# Patient Record
Sex: FEMALE | Race: WHITE | NOT HISPANIC OR LATINO | URBAN - METROPOLITAN AREA
[De-identification: names, ages, dates, MRNs, and addresses within clinical notes are randomized per-mention and may not be internally consistent; named-entity substitution may affect disease eponyms.]

---

## 2019-07-08 ENCOUNTER — INPATIENT (INPATIENT)
Facility: HOSPITAL | Age: 69
LOS: 1 days | Discharge: HOME | DRG: 287 | End: 2019-07-10
Attending: INTERNAL MEDICINE | Admitting: INTERNAL MEDICINE
Payer: COMMERCIAL

## 2019-07-08 VITALS
HEART RATE: 68 BPM | WEIGHT: 115.08 LBS | RESPIRATION RATE: 16 BRPM | TEMPERATURE: 98 F | DIASTOLIC BLOOD PRESSURE: 84 MMHG | SYSTOLIC BLOOD PRESSURE: 132 MMHG | OXYGEN SATURATION: 98 % | HEIGHT: 62 IN

## 2019-07-08 DIAGNOSIS — R94.39 ABNORMAL RESULT OF OTHER CARDIOVASCULAR FUNCTION STUDY: ICD-10-CM

## 2019-07-08 LAB
ALBUMIN SERPL ELPH-MCNC: 3.8 G/DL — SIGNIFICANT CHANGE UP (ref 3.3–5)
ALP SERPL-CCNC: 49 U/L — SIGNIFICANT CHANGE UP (ref 40–120)
ALT FLD-CCNC: 21 U/L — SIGNIFICANT CHANGE UP (ref 10–45)
ANION GAP SERPL CALC-SCNC: 12 MMOL/L — SIGNIFICANT CHANGE UP (ref 5–17)
APTT BLD: 26 SEC — LOW (ref 27.5–36.3)
AST SERPL-CCNC: 23 U/L — SIGNIFICANT CHANGE UP (ref 10–40)
BASOPHILS # BLD AUTO: 0 K/UL — SIGNIFICANT CHANGE UP (ref 0–0.2)
BASOPHILS NFR BLD AUTO: 0.2 % — SIGNIFICANT CHANGE UP (ref 0–2)
BILIRUB SERPL-MCNC: 0.6 MG/DL — SIGNIFICANT CHANGE UP (ref 0.2–1.2)
BUN SERPL-MCNC: 11 MG/DL — SIGNIFICANT CHANGE UP (ref 7–23)
CALCIUM SERPL-MCNC: 8.5 MG/DL — SIGNIFICANT CHANGE UP (ref 8.4–10.5)
CHLORIDE SERPL-SCNC: 101 MMOL/L — SIGNIFICANT CHANGE UP (ref 96–108)
CK MB CFR SERPL CALC: 4.3 NG/ML — HIGH (ref 0–3.8)
CK SERPL-CCNC: 76 U/L — SIGNIFICANT CHANGE UP (ref 25–170)
CO2 SERPL-SCNC: 25 MMOL/L — SIGNIFICANT CHANGE UP (ref 22–31)
CREAT SERPL-MCNC: 0.78 MG/DL — SIGNIFICANT CHANGE UP (ref 0.5–1.3)
EOSINOPHIL # BLD AUTO: 0.2 K/UL — SIGNIFICANT CHANGE UP (ref 0–0.5)
EOSINOPHIL NFR BLD AUTO: 2.1 % — SIGNIFICANT CHANGE UP (ref 0–6)
GLUCOSE SERPL-MCNC: 127 MG/DL — HIGH (ref 70–99)
HCT VFR BLD CALC: 41.4 % — SIGNIFICANT CHANGE UP (ref 34.5–45)
HGB BLD-MCNC: 14.5 G/DL — SIGNIFICANT CHANGE UP (ref 11.5–15.5)
INR BLD: 1.01 RATIO — SIGNIFICANT CHANGE UP (ref 0.88–1.16)
LYMPHOCYTES # BLD AUTO: 2 K/UL — SIGNIFICANT CHANGE UP (ref 1–3.3)
LYMPHOCYTES # BLD AUTO: 24.7 % — SIGNIFICANT CHANGE UP (ref 13–44)
MCHC RBC-ENTMCNC: 34.2 PG — HIGH (ref 27–34)
MCHC RBC-ENTMCNC: 35.1 GM/DL — SIGNIFICANT CHANGE UP (ref 32–36)
MCV RBC AUTO: 97.5 FL — SIGNIFICANT CHANGE UP (ref 80–100)
MONOCYTES # BLD AUTO: 0.6 K/UL — SIGNIFICANT CHANGE UP (ref 0–0.9)
MONOCYTES NFR BLD AUTO: 7.6 % — SIGNIFICANT CHANGE UP (ref 2–14)
NEUTROPHILS # BLD AUTO: 5.4 K/UL — SIGNIFICANT CHANGE UP (ref 1.8–7.4)
NEUTROPHILS NFR BLD AUTO: 65.4 % — SIGNIFICANT CHANGE UP (ref 43–77)
NT-PROBNP SERPL-SCNC: 2616 PG/ML — HIGH (ref 0–300)
PLATELET # BLD AUTO: 215 K/UL — SIGNIFICANT CHANGE UP (ref 150–400)
POTASSIUM SERPL-MCNC: 4 MMOL/L — SIGNIFICANT CHANGE UP (ref 3.5–5.3)
POTASSIUM SERPL-SCNC: 4 MMOL/L — SIGNIFICANT CHANGE UP (ref 3.5–5.3)
PROT SERPL-MCNC: 6.3 G/DL — SIGNIFICANT CHANGE UP (ref 6–8.3)
PROTHROM AB SERPL-ACNC: 11.6 SEC — SIGNIFICANT CHANGE UP (ref 10–12.9)
RBC # BLD: 4.24 M/UL — SIGNIFICANT CHANGE UP (ref 3.8–5.2)
RBC # FLD: 11.8 % — SIGNIFICANT CHANGE UP (ref 10.3–14.5)
SODIUM SERPL-SCNC: 138 MMOL/L — SIGNIFICANT CHANGE UP (ref 135–145)
TROPONIN T, HIGH SENSITIVITY RESULT: 118 NG/L — HIGH (ref 0–51)
TROPONIN T, HIGH SENSITIVITY RESULT: 128 NG/L — HIGH (ref 0–51)
WBC # BLD: 8.2 K/UL — SIGNIFICANT CHANGE UP (ref 3.8–10.5)
WBC # FLD AUTO: 8.2 K/UL — SIGNIFICANT CHANGE UP (ref 3.8–10.5)

## 2019-07-08 PROCEDURE — 71046 X-RAY EXAM CHEST 2 VIEWS: CPT | Mod: 26

## 2019-07-08 PROCEDURE — 99285 EMERGENCY DEPT VISIT HI MDM: CPT

## 2019-07-08 PROCEDURE — 93010 ELECTROCARDIOGRAM REPORT: CPT

## 2019-07-08 RX ORDER — TICAGRELOR 90 MG/1
180 TABLET ORAL ONCE
Refills: 0 | Status: COMPLETED | OUTPATIENT
Start: 2019-07-08 | End: 2019-07-08

## 2019-07-08 RX ORDER — HEPARIN SODIUM 5000 [USP'U]/ML
2900 INJECTION INTRAVENOUS; SUBCUTANEOUS ONCE
Refills: 0 | Status: COMPLETED | OUTPATIENT
Start: 2019-07-08 | End: 2019-07-08

## 2019-07-08 RX ORDER — HEPARIN SODIUM 5000 [USP'U]/ML
INJECTION INTRAVENOUS; SUBCUTANEOUS
Qty: 25000 | Refills: 0 | Status: DISCONTINUED | OUTPATIENT
Start: 2019-07-08 | End: 2019-07-09

## 2019-07-08 RX ORDER — TICAGRELOR 90 MG/1
90 TABLET ORAL EVERY 12 HOURS
Refills: 0 | Status: DISCONTINUED | OUTPATIENT
Start: 2019-07-08 | End: 2019-07-09

## 2019-07-08 RX ORDER — HEPARIN SODIUM 5000 [USP'U]/ML
2900 INJECTION INTRAVENOUS; SUBCUTANEOUS EVERY 6 HOURS
Refills: 0 | Status: DISCONTINUED | OUTPATIENT
Start: 2019-07-08 | End: 2019-07-09

## 2019-07-08 RX ORDER — ACETAMINOPHEN 500 MG
650 TABLET ORAL EVERY 6 HOURS
Refills: 0 | Status: DISCONTINUED | OUTPATIENT
Start: 2019-07-08 | End: 2019-07-10

## 2019-07-08 RX ORDER — ASPIRIN/CALCIUM CARB/MAGNESIUM 324 MG
324 TABLET ORAL ONCE
Refills: 0 | Status: COMPLETED | OUTPATIENT
Start: 2019-07-08 | End: 2019-07-08

## 2019-07-08 RX ORDER — ASPIRIN/CALCIUM CARB/MAGNESIUM 324 MG
81 TABLET ORAL DAILY
Refills: 0 | Status: DISCONTINUED | OUTPATIENT
Start: 2019-07-08 | End: 2019-07-10

## 2019-07-08 RX ADMIN — TICAGRELOR 180 MILLIGRAM(S): 90 TABLET ORAL at 19:37

## 2019-07-08 RX ADMIN — Medication 324 MILLIGRAM(S): at 18:48

## 2019-07-08 RX ADMIN — HEPARIN SODIUM 600 UNIT(S)/HR: 5000 INJECTION INTRAVENOUS; SUBCUTANEOUS at 19:36

## 2019-07-08 RX ADMIN — HEPARIN SODIUM 2900 UNIT(S): 5000 INJECTION INTRAVENOUS; SUBCUTANEOUS at 19:35

## 2019-07-08 NOTE — ED PROVIDER NOTE - PROGRESS NOTE DETAILS
attending Gladis: cardiologist Moriah ford attending Gladis: cardiologist Moriah paged [phone] attending Gladis: discussed with Dr. Perez who will take the patient on his service. Plan for Brilinta and heparin ACS protocol and cath tomorrow. attending Gladis: positive 1st troponin. Made admitting hospitalist Abrudescu aware who will notify Dr. Perez. Pt receiving heparin gtt.

## 2019-07-08 NOTE — ED PROVIDER NOTE - CLINICAL SUMMARY MEDICAL DECISION MAKING FREE TEXT BOX
attending Gladis: abnormal stress test. Will obtain ekg, place on tele, labs, ASA, discuss with cardiology, admit for cath

## 2019-07-08 NOTE — H&P ADULT - HISTORY OF PRESENT ILLNESS
68yF no PMH sent in by cardiologist after abnormal nuclear stress test today. Pt reports feeling chest tightness and profound generalized fatigue/weakness after exertion 3 days ago. Reports normal stress test several years ago. Never smoker. No fam h/o CAD. Not on ASA daily. +mild chest tightness in ED.

## 2019-07-08 NOTE — H&P ADULT - NSHPPHYSICALEXAM_GEN_ALL_CORE
PHYSICAL EXAMINATION:  Vital Signs Last 24 Hrs  T(C): 36.7 (08 Jul 2019 18:25), Max: 36.7 (08 Jul 2019 18:25)  T(F): 98 (08 Jul 2019 18:25), Max: 98 (08 Jul 2019 18:25)  HR: 68 (08 Jul 2019 18:25) (68 - 68)  BP: 132/84 (08 Jul 2019 18:25) (132/84 - 132/84)  BP(mean): --  RR: 16 (08 Jul 2019 18:25) (16 - 16)  SpO2: 98% (08 Jul 2019 18:25) (98% - 98%)  CAPILLARY BLOOD GLUCOSE          GENERAL: NAD, well-groomed, well-developed  HEAD:  atraumatic, normocephalic  EYES: sclera anicteric  ENMT: mucous membranes moist  NECK: supple, No JVD  CHEST/LUNG: clear to auscultation bilaterally; no rales, rhonchi, or wheezing b/l  HEART: normal S1, S2  ABDOMEN: BS+, soft, ND, NT   EXTREMITIES:  pulses palpable; no clubbing, cyanosis, or edema b/l LEs  NEURO: awake, alert, interactive; moves all extremities  SKIN: no rashes or lesions

## 2019-07-08 NOTE — ED ADULT NURSE NOTE - CHPI ED NUR SYMPTOMS NEG
no chills/no shortness of breath/no vomiting/no dizziness/no nausea/no syncope/no congestion/no diaphoresis/no fever/no back pain

## 2019-07-08 NOTE — ED PROVIDER NOTE - OBJECTIVE STATEMENT
attending Pollack: 68yF no PMH sent in by cardiologist Dr. Major after abnormal nuclear stress test today. Pt reports feeling chest tightness and profound generalized fatigue/weakness after exertion 3 days ago. Reports normal stress test several years ago. Never smoker. No fam h/o CAD. Not on ASA daily. +mild chest tightness in ED.

## 2019-07-08 NOTE — ED ADULT NURSE NOTE - OBJECTIVE STATEMENT
69 y/o female A&Ox3 presents to ED via EMS from PMD for "abnormal nuclear stress test". As per pt, intermittent left side chest pain with no radiation x3 days. Went to PMD who told pt to come to ED. Upon arrival, pt placed on cardiac monitor, NSR with HR in the 70s, EKG completed. B/l breath sounds clear, non labored, no cyanosis, edema or SOB. Abdomen soft, non tender and non distended, no dysuria, or blood in urine or stool. +ROMx4 extremities, no weakness, numbness or tingling. No anticoagulant use, no medical hx. Denies fever, chills, n/v/d. Safety measures maintained.

## 2019-07-08 NOTE — H&P ADULT - NSHPREVIEWOFSYSTEMS_GEN_ALL_CORE
REVIEW OF SYSTEMS:    CONSTITUTIONAL: No weakness, fevers or chills  EYES/ENT: No visual changes;  No vertigo or throat pain   NECK: No pain or stiffness  RESPIRATORY: No cough, wheezing, hemoptysis; + shortness of breath  CARDIOVASCULAR: No chest pain or palpitations Chest tightness.  GASTROINTESTINAL: No abdominal or epigastric pain. No nausea, vomiting, or hematemesis; No diarrhea or constipation. No melena or hematochezia.  GENITOURINARY: No dysuria, frequency or hematuria  NEUROLOGICAL: No numbness or weakness  SKIN: No itching, burning, rashes, or lesions   All other review of systems is negative unless indicated above.

## 2019-07-08 NOTE — H&P ADULT - ASSESSMENT
68 f with    Chest pain  - telemetry  - cardiac enzymes  - Heparin gtt, ASA, Brilinta  - cardiology evaluation Dr. Perez    Osteoporosis  - hold Evista    d/w patient/   Further action as per clinical course   Ángel Vaughn MD pager [phone]

## 2019-07-09 LAB
ANION GAP SERPL CALC-SCNC: 11 MMOL/L — SIGNIFICANT CHANGE UP (ref 5–17)
APTT BLD: 67.9 SEC — HIGH (ref 27.5–36.3)
BUN SERPL-MCNC: 14 MG/DL — SIGNIFICANT CHANGE UP (ref 7–23)
CALCIUM SERPL-MCNC: 8.3 MG/DL — LOW (ref 8.4–10.5)
CHLORIDE SERPL-SCNC: 103 MMOL/L — SIGNIFICANT CHANGE UP (ref 96–108)
CO2 SERPL-SCNC: 25 MMOL/L — SIGNIFICANT CHANGE UP (ref 22–31)
CREAT SERPL-MCNC: 0.85 MG/DL — SIGNIFICANT CHANGE UP (ref 0.5–1.3)
GLUCOSE SERPL-MCNC: 96 MG/DL — SIGNIFICANT CHANGE UP (ref 70–99)
HCT VFR BLD CALC: 39.2 % — SIGNIFICANT CHANGE UP (ref 34.5–45)
HCT VFR BLD CALC: 41.7 % — SIGNIFICANT CHANGE UP (ref 34.5–45)
HCV AB S/CO SERPL IA: 0.13 S/CO — SIGNIFICANT CHANGE UP (ref 0–0.99)
HCV AB SERPL-IMP: SIGNIFICANT CHANGE UP
HGB BLD-MCNC: 13.8 G/DL — SIGNIFICANT CHANGE UP (ref 11.5–15.5)
HGB BLD-MCNC: 14.2 G/DL — SIGNIFICANT CHANGE UP (ref 11.5–15.5)
MCHC RBC-ENTMCNC: 33.2 PG — SIGNIFICANT CHANGE UP (ref 27–34)
MCHC RBC-ENTMCNC: 34 GM/DL — SIGNIFICANT CHANGE UP (ref 32–36)
MCHC RBC-ENTMCNC: 34.3 PG — HIGH (ref 27–34)
MCHC RBC-ENTMCNC: 35.2 GM/DL — SIGNIFICANT CHANGE UP (ref 32–36)
MCV RBC AUTO: 97.5 FL — SIGNIFICANT CHANGE UP (ref 80–100)
MCV RBC AUTO: 97.6 FL — SIGNIFICANT CHANGE UP (ref 80–100)
PLATELET # BLD AUTO: 209 K/UL — SIGNIFICANT CHANGE UP (ref 150–400)
PLATELET # BLD AUTO: 212 K/UL — SIGNIFICANT CHANGE UP (ref 150–400)
POTASSIUM SERPL-MCNC: 3.9 MMOL/L — SIGNIFICANT CHANGE UP (ref 3.5–5.3)
POTASSIUM SERPL-SCNC: 3.9 MMOL/L — SIGNIFICANT CHANGE UP (ref 3.5–5.3)
RBC # BLD: 4.02 M/UL — SIGNIFICANT CHANGE UP (ref 3.8–5.2)
RBC # BLD: 4.27 M/UL — SIGNIFICANT CHANGE UP (ref 3.8–5.2)
RBC # FLD: 11.7 % — SIGNIFICANT CHANGE UP (ref 10.3–14.5)
RBC # FLD: 11.8 % — SIGNIFICANT CHANGE UP (ref 10.3–14.5)
SODIUM SERPL-SCNC: 139 MMOL/L — SIGNIFICANT CHANGE UP (ref 135–145)
TROPONIN T, HIGH SENSITIVITY RESULT: 106 NG/L — HIGH (ref 0–51)
WBC # BLD: 6.7 K/UL — SIGNIFICANT CHANGE UP (ref 3.8–10.5)
WBC # BLD: 7.2 K/UL — SIGNIFICANT CHANGE UP (ref 3.8–10.5)
WBC # FLD AUTO: 6.7 K/UL — SIGNIFICANT CHANGE UP (ref 3.8–10.5)
WBC # FLD AUTO: 7.2 K/UL — SIGNIFICANT CHANGE UP (ref 3.8–10.5)

## 2019-07-09 PROCEDURE — 93306 TTE W/DOPPLER COMPLETE: CPT | Mod: 26

## 2019-07-09 RX ORDER — RALOXIFENE HYDROCHLORIDE 60 MG/1
1 TABLET, COATED ORAL
Qty: 0 | Refills: 0 | DISCHARGE

## 2019-07-09 RX ORDER — CHOLECALCIFEROL (VITAMIN D3) 125 MCG
1 CAPSULE ORAL
Qty: 0 | Refills: 0 | DISCHARGE

## 2019-07-09 RX ADMIN — Medication 81 MILLIGRAM(S): at 08:24

## 2019-07-09 RX ADMIN — TICAGRELOR 90 MILLIGRAM(S): 90 TABLET ORAL at 08:24

## 2019-07-09 RX ADMIN — HEPARIN SODIUM 600 UNIT(S)/HR: 5000 INJECTION INTRAVENOUS; SUBCUTANEOUS at 02:04

## 2019-07-10 ENCOUNTER — TRANSCRIPTION ENCOUNTER (OUTPATIENT)
Age: 69
End: 2019-07-10

## 2019-07-10 VITALS
TEMPERATURE: 98 F | HEART RATE: 73 BPM | DIASTOLIC BLOOD PRESSURE: 63 MMHG | RESPIRATION RATE: 16 BRPM | OXYGEN SATURATION: 98 % | SYSTOLIC BLOOD PRESSURE: 101 MMHG

## 2019-07-10 LAB
ANION GAP SERPL CALC-SCNC: 8 MMOL/L — SIGNIFICANT CHANGE UP (ref 5–17)
BUN SERPL-MCNC: 14 MG/DL — SIGNIFICANT CHANGE UP (ref 7–23)
CALCIUM SERPL-MCNC: 8.6 MG/DL — SIGNIFICANT CHANGE UP (ref 8.4–10.5)
CHLORIDE SERPL-SCNC: 98 MMOL/L — SIGNIFICANT CHANGE UP (ref 96–108)
CO2 SERPL-SCNC: 25 MMOL/L — SIGNIFICANT CHANGE UP (ref 22–31)
CORTIS AM PEAK SERPL-MCNC: 16.3 UG/DL — SIGNIFICANT CHANGE UP (ref 6–18.4)
CREAT SERPL-MCNC: 0.84 MG/DL — SIGNIFICANT CHANGE UP (ref 0.5–1.3)
GLUCOSE SERPL-MCNC: 122 MG/DL — HIGH (ref 70–99)
HCT VFR BLD CALC: 39.9 % — SIGNIFICANT CHANGE UP (ref 34.5–45)
HGB BLD-MCNC: 14.1 G/DL — SIGNIFICANT CHANGE UP (ref 11.5–15.5)
MCHC RBC-ENTMCNC: 34.3 PG — HIGH (ref 27–34)
MCHC RBC-ENTMCNC: 35.3 GM/DL — SIGNIFICANT CHANGE UP (ref 32–36)
MCV RBC AUTO: 97.3 FL — SIGNIFICANT CHANGE UP (ref 80–100)
PLATELET # BLD AUTO: 197 K/UL — SIGNIFICANT CHANGE UP (ref 150–400)
POTASSIUM SERPL-MCNC: 4.1 MMOL/L — SIGNIFICANT CHANGE UP (ref 3.5–5.3)
POTASSIUM SERPL-SCNC: 4.1 MMOL/L — SIGNIFICANT CHANGE UP (ref 3.5–5.3)
RBC # BLD: 4.1 M/UL — SIGNIFICANT CHANGE UP (ref 3.8–5.2)
RBC # FLD: 11.8 % — SIGNIFICANT CHANGE UP (ref 10.3–14.5)
SODIUM SERPL-SCNC: 131 MMOL/L — LOW (ref 135–145)
TSH SERPL-MCNC: 2.76 UIU/ML — SIGNIFICANT CHANGE UP (ref 0.27–4.2)
WBC # BLD: 9.4 K/UL — SIGNIFICANT CHANGE UP (ref 3.8–10.5)
WBC # FLD AUTO: 9.4 K/UL — SIGNIFICANT CHANGE UP (ref 3.8–10.5)

## 2019-07-10 PROCEDURE — 84443 ASSAY THYROID STIM HORMONE: CPT

## 2019-07-10 PROCEDURE — C1760: CPT

## 2019-07-10 PROCEDURE — 93458 L HRT ARTERY/VENTRICLE ANGIO: CPT

## 2019-07-10 PROCEDURE — 82553 CREATINE MB FRACTION: CPT

## 2019-07-10 PROCEDURE — C1887: CPT

## 2019-07-10 PROCEDURE — 71046 X-RAY EXAM CHEST 2 VIEWS: CPT

## 2019-07-10 PROCEDURE — 99285 EMERGENCY DEPT VISIT HI MDM: CPT | Mod: 25

## 2019-07-10 PROCEDURE — 82533 TOTAL CORTISOL: CPT

## 2019-07-10 PROCEDURE — 86803 HEPATITIS C AB TEST: CPT

## 2019-07-10 PROCEDURE — 80048 BASIC METABOLIC PNL TOTAL CA: CPT

## 2019-07-10 PROCEDURE — 85610 PROTHROMBIN TIME: CPT

## 2019-07-10 PROCEDURE — 80053 COMPREHEN METABOLIC PANEL: CPT

## 2019-07-10 PROCEDURE — 93306 TTE W/DOPPLER COMPLETE: CPT

## 2019-07-10 PROCEDURE — C1894: CPT

## 2019-07-10 PROCEDURE — 84484 ASSAY OF TROPONIN QUANT: CPT

## 2019-07-10 PROCEDURE — 99153 MOD SED SAME PHYS/QHP EA: CPT

## 2019-07-10 PROCEDURE — 85730 THROMBOPLASTIN TIME PARTIAL: CPT

## 2019-07-10 PROCEDURE — C1769: CPT

## 2019-07-10 PROCEDURE — 93005 ELECTROCARDIOGRAM TRACING: CPT

## 2019-07-10 PROCEDURE — 82550 ASSAY OF CK (CPK): CPT

## 2019-07-10 PROCEDURE — 99152 MOD SED SAME PHYS/QHP 5/>YRS: CPT

## 2019-07-10 PROCEDURE — 83880 ASSAY OF NATRIURETIC PEPTIDE: CPT

## 2019-07-10 PROCEDURE — 85027 COMPLETE CBC AUTOMATED: CPT

## 2019-07-10 RX ORDER — ASPIRIN/CALCIUM CARB/MAGNESIUM 324 MG
1 TABLET ORAL
Qty: 0 | Refills: 0 | DISCHARGE
Start: 2019-07-10

## 2019-07-10 RX ADMIN — Medication 81 MILLIGRAM(S): at 05:22

## 2019-07-10 NOTE — DISCHARGE NOTE NURSING/CASE MANAGEMENT/SOCIAL WORK - NSDCDPATPORTLINK_GEN_ALL_CORE
You can access the [facility] Portal, offered by [facility], by registering with the following website: [email or web address]

## 2019-07-10 NOTE — DISCHARGE NOTE PROVIDER - HOSPITAL COURSE
HPI:    68yF no PMH sent in by cardiologist after abnormal nuclear stress test today. Pt reports feeling chest tightness and profound generalized fatigue/weakness after exertion 3 days ago. Reports normal stress test several years ago. Never smoker. No fam h/o CAD. Not on ASA daily. +mild chest tightness in ED. (08 Jul 2019 20:17).  Pt s/p diagnostic cath with normal coronaries with echo revealing Takotsubo pattern MR/CARLTON and mild LVOT obstruction most likely from acute dilatation of the distal left ventricular segments LVEF 35-40%.  Pt will be treated medically although she is unable to be treated with BB or ACEI 2/2 hypotension with negative orthostatic BP and HR.  Pt's BP equilibrates to baseline upon standing and walking.  Her TSH and cortisol levels are WNL.  She has been seen and counseled by Dr. Perez to remain hydrated and avoid strenuous exercise for now until her LV function improves.  She will f/u with Dr. Andrew for repeat echo and followup.

## 2019-07-10 NOTE — PROGRESS NOTE ADULT - SUBJECTIVE AND OBJECTIVE BOX
Patient is a 68y old  Female who presents with a chief complaint of sob (10 Jul 2019 16:48)      SUBJECTIVE / OVERNIGHT EVENTS: Comfortable without new complaints.   Review of Systems  chest pain no  palpitations no  sob no  nausea no  headache no    MEDICATIONS  (STANDING):  aspirin enteric coated 81 milliGRAM(s) Oral daily    MEDICATIONS  (PRN):  acetaminophen   Tablet .. 650 milliGRAM(s) Oral every 6 hours PRN Temp greater or equal to 38.5C (101.3F), Mild Pain (1 - 3)      Vital Signs Last 24 Hrs  T(C): 36.7 (10 Jul 2019 17:14), Max: 37.1 (10 Jul 2019 05:16)  T(F): 98 (10 Jul 2019 17:14), Max: 98.8 (10 Jul 2019 05:16)  HR: 73 (10 Jul 2019 17:14) (69 - 76)  BP: 101/63 (10 Jul 2019 17:14) (88/48 - 107/73)  BP(mean): --  RR: 16 (10 Jul 2019 17:14) (16 - 17)  SpO2: 98% (10 Jul 2019 17:14) (97% - 99%)    PHYSICAL EXAM:  GENERAL: NAD, well-developed  HEAD:  Atraumatic, Normocephalic  EYES: EOMI, PERRLA, conjunctiva and sclera clear  NECK: Supple, No JVD  CHEST/LUNG: Clear to auscultation bilaterally; No wheeze  HEART: Regular rate and rhythm; No murmurs, rubs, or gallops  ABDOMEN: Soft, Nontender, Nondistended; Bowel sounds present  EXTREMITIES:  2+ Peripheral Pulses, No clubbing, cyanosis, or edema  PSYCH: AAOx3  NEUROLOGY: non-focal  SKIN: No rashes or lesions    LABS:                        14.1   9.4   )-----------( 197      ( 10 Jul 2019 00:28 )             39.9     07-10    131<L>  |  98  |  14  ----------------------------<  122<H>  4.1   |  25  |  0.84    Ca    8.6      10 Jul 2019 00:28    TPro  6.3  /  Alb  3.8  /  TBili  0.6  /  DBili  x   /  AST  23  /  ALT  21  /  AlkPhos  49  07-08    PT/INR - ( 08 Jul 2019 19:02 )   PT: 11.6 sec;   INR: 1.01 ratio         PTT - ( 09 Jul 2019 01:29 )  PTT:67.9 sec  CARDIAC MARKERS ( 08 Jul 2019 21:12 )  x     / x     / 76 U/L / x     / 4.3 ng/mL        < from: Transthoracic Echocardiogram (07.09.19 @ 11:59) >  Conclusions:  1. Tethered mitral valve leaflets with normal  opening.Chordal CARLTON notes. Peak LVOT with valsalva 21mmHg  Moderate-severe mitral regurgitation.  2. Moderate- Severe segmental left ventricular systolic  dysfunction. The mid anterior wall is hypokinetic. The  apical anterior wall, the apical lateral wall, the  mid-apical  inferior wall, the mid -apical septum, and the  apical cap are akinetic.  3. Normal right ventricular size and function.  *** No previous Echo exam.    < end of copied text >      RADIOLOGY & ADDITIONAL TESTS:    Imaging Personally Reviewed:    Consultant(s) Notes Reviewed:      Care Discussed with Consultants/Other Providers:
CARDIOLOGY FOLLOW UP - Dr. Perez    CC no cp or sob       PHYSICAL EXAM:  T(C): 36.7 (07-10-19 @ 13:01), Max: 37.1 (07-10-19 @ 05:16)  HR: 75 (07-10-19 @ 13:01) (69 - 76)  BP: 96/56 (07-10-19 @ 13:01) (88/48 - 107/73)  RR: 17 (07-10-19 @ 13:01) (16 - 17)  SpO2: 97% (07-10-19 @ 13:01) (97% - 99%)  Wt(kg): --  I&O's Summary    09 Jul 2019 07:01  -  10 Jul 2019 07:00  --------------------------------------------------------  IN: 660 mL / OUT: 0 mL / NET: 660 mL    10 Jul 2019 07:01  -  10 Jul 2019 15:25  --------------------------------------------------------  IN: 240 mL / OUT: 0 mL / NET: 240 mL        Appearance: Normal	  Cardiovascular: Normal S1 S2,RRR,+ murmurs  Respiratory: Lungs clear to auscultation	  Gastrointestinal:  Soft, Non-tender, + BS	  Extremities: Normal range of motion, No clubbing, cyanosis or edema  right groin CDI no hematoma + pulses bl LE       MEDICATIONS  (STANDING):  aspirin enteric coated 81 milliGRAM(s) Oral daily      TELEMETRY: NSR 	    ECG:  	  RADIOLOGY:   DIAGNOSTIC TESTING:  [x ] Echocardiogram:  < from: Transthoracic Echocardiogram (07.09.19 @ 11:59) >  EF (Visual Estimate): 35-40 %  ------------------------------------------------------------------------  Observations:  Mitral Valve: Tethered mitral valve leaflets with normal  opening.Chordal CARLTON notes. Peak LVOT with valsalva 21mmHg  Moderate-severe mitral regurgitation.  Aortic Valve/Aorta: Normal trileaflet aortic valve. Peak  left ventricular outflowtract gradient equals 12 mm Hg.  Aortic Root: 3.4 cm.  LVOT diameter: 1.9 cm.  Left Atrium: Normal left atrium.  LA volume index = 23  cc/m2.  Left Ventricle: Moderate- Severe segmental left ventricular  systolic dysfunction. The mid anterior wall ishypokinetic.  The apical anterior wall, the apical lateral wall, the  mid-apical  inferior wall, the mid -apical septum, and the  apical cap are akinetic.  Right Heart: Mild right atrial enlargement. Normal right  ventricular size and function. Normal tricuspid valve. Mild  tricuspid regurgitation. Normal pulmonic valve.  Pericardium/Pleura: Normal pericardium with trace  pericardial effusion.  Hemodynamic: Estimated right atrial pressure is 8 mm Hg.  Estimated right ventricular systolic pressureequals 29 mm  Hg, assuming right atrial pressure equals 8 mm Hg,  consistent with normal pulmonary pressures.  ------------------------------------------------------------------------  Conclusions:  1. Tethered mitral valve leaflets with normal  opening.Chordal CARLTON notes. Peak LVOT with valsalva 21mmHg  Moderate-severe mitral regurgitation.  2. Moderate- Severe segmental left ventricular systolic  dysfunction. The mid anterior wall is hypokinetic. The  apical anterior wall, the apical lateral wall, the  mid-apical  inferior wall, the mid -apical septum, and the  apical cap are akinetic.  3. Normal right ventricular size and function.  *** No previous Echo exam.  ------------------------------------------------------------------------  Confirmed on  7/10/2019 - 14:45:57 by Karli Leonel, M.D.  ------------------------------------------------------------------------    < end of copied text >    [ x ]  Catheterization:  < from: Cardiac Cath Lab - Adult (07.09.19 @ 08:45) >  CORONARY VESSELS: The coronary circulation is right dominant.  LM:   --  LM: Normal.  LAD:   --  LAD: Normal.  CX:   --  Circumflex: Normal.  RCA:   --  RCA: Normal.  COMPLICATIONS: There were no complications.  DIAGNOSTIC IMPRESSIONS: The coronary anatomy is normal. Left ventricular  function is abnormal. Moderate, non ischemic cardiomyopathy. Cinical  picture consistent with a likely stress-induced cardiomyopathy.  DIAGNOSTIC RECOMMENDATIONS: Medical management of LV dysfunction.  Continue routine post-cath care.  Anticipate discharge later today if clinical course remains uncomplicated.  INTERVENTIONAL RECOMMENDATIONS: Medical management of LV dysfunction.  Continue routine post-cath care.  Anticipate discharge later today if clinical course remains uncomplicated.  DISPOSITION: The patient left the catheterization laboratory in stable  condition.  Prepared and signed by  Lukas Perez M.D.    < end of copied text >    [ ] Stress Test:    OTHER: 	    LABS:	 	    Troponin T, High Sensitivity Result: 106 ng/L [0 - 51] (07-09 @ 04:59)  Creatine Kinase, Serum: 76 U/L [25 - 170] (07-08 @ 21:12)  CKMB Units: 4.3 ng/mL [0.0 - 3.8] (07-08 @ 21:12)  Troponin T, High Sensitivity Result: 128 ng/L [0 - 51] (07-08 @ 21:12)  Troponin T, High Sensitivity Result: 118 ng/L [0 - 51] (07-08 @ 19:02)                          14.1   9.4   )-----------( 197      ( 10 Jul 2019 00:28 )             39.9     07-10    131<L>  |  98  |  14  ----------------------------<  122<H>  4.1   |  25  |  0.84    Ca    8.6      10 Jul 2019 00:28    TPro  6.3  /  Alb  3.8  /  TBili  0.6  /  DBili  x   /  AST  23  /  ALT  21  /  AlkPhos  49  07-08    PT/INR - ( 08 Jul 2019 19:02 )   PT: 11.6 sec;   INR: 1.01 ratio         PTT - ( 09 Jul 2019 01:29 )  PTT:67.9 sec
Patient is a 68y old  Female who presents with a chief complaint of sob (08 Jul 2019 20:17)      SUBJECTIVE / OVERNIGHT EVENTS: Comfortable without new complaints.   Review of Systems  chest pain no  palpitations no  sob no  nausea no  headache no    MEDICATIONS  (STANDING):  aspirin enteric coated 81 milliGRAM(s) Oral daily  heparin  Infusion.  Unit(s)/Hr (6 mL/Hr) IV Continuous <Continuous>  ticagrelor 90 milliGRAM(s) Oral every 12 hours    MEDICATIONS  (PRN):  acetaminophen   Tablet .. 650 milliGRAM(s) Oral every 6 hours PRN Temp greater or equal to 38.5C (101.3F), Mild Pain (1 - 3)  heparin  Injectable 2900 Unit(s) IV Push every 6 hours PRN For aPTT less than 40      Vital Signs Last 24 Hrs  T(C): 36.4 (09 Jul 2019 04:46), Max: 36.7 (08 Jul 2019 18:25)  T(F): 97.6 (09 Jul 2019 04:46), Max: 98 (08 Jul 2019 18:25)  HR: 69 (09 Jul 2019 14:15) (58 - 79)  BP: 97/56 (09 Jul 2019 14:15) (90/55 - 132/84)  BP(mean): --  RR: 17 (09 Jul 2019 14:15) (16 - 18)  SpO2: 100% (09 Jul 2019 14:15) (97% - 100%)    PHYSICAL EXAM:  GENERAL: NAD, well-developed  HEAD:  Atraumatic, Normocephalic  EYES: EOMI, PERRLA, conjunctiva and sclera clear  NECK: Supple, No JVD  CHEST/LUNG: Clear to auscultation bilaterally; No wheeze  HEART: Regular rate and rhythm; No murmurs, rubs, or gallops  ABDOMEN: Soft, Nontender, Nondistended; Bowel sounds present  EXTREMITIES:  2+ Peripheral Pulses, No clubbing, cyanosis, or edema  PSYCH: AAOx3  NEUROLOGY: non-focal  SKIN: No rashes or lesions    LABS:                        14.2   6.7   )-----------( 212      ( 09 Jul 2019 04:59 )             41.7     07-09    139  |  103  |  14  ----------------------------<  96  3.9   |  25  |  0.85    Ca    8.3<L>      09 Jul 2019 04:59    TPro  6.3  /  Alb  3.8  /  TBili  0.6  /  DBili  x   /  AST  23  /  ALT  21  /  AlkPhos  49  07-08    PT/INR - ( 08 Jul 2019 19:02 )   PT: 11.6 sec;   INR: 1.01 ratio         PTT - ( 09 Jul 2019 01:29 )  PTT:67.9 sec  CARDIAC MARKERS ( 08 Jul 2019 21:12 )  x     / x     / 76 U/L / x     / 4.3 ng/mL            RADIOLOGY & ADDITIONAL TESTS:    Imaging Personally Reviewed:  < from: Cardiac Cath Lab - Adult (07.09.19 @ 08:45) >  DIAGNOSTIC IMPRESSIONS: The coronary anatomy is normal. Left ventricular  function is abnormal. Moderate, non ischemic cardiomyopathy. Cinical  picture consistent with a likely stress-induced cardiomyopathy.    < end of copied text >    Consultant(s) Notes Reviewed:      Care Discussed with Consultants/Other Providers:

## 2019-07-10 NOTE — DISCHARGE NOTE PROVIDER - NSDCACTIVITY_GEN_ALL_CORE
No heavy lifting, strenuous activity, bending, straining, or unnecessary stair climbing for 1 week. May drive tomorrow. You may shower today but avoid baths/swimming for 1 week. Check your groin site for bleeding and/or swelling daily following procedure and call your doctor immediately if it occurs or if you experience increased pain at the site. Follow up with your cardiologist in 1-2 weeks./Walking - Outdoors allowed/Walking - Indoors allowed/No heavy lifting/straining/Showering allowed

## 2019-07-10 NOTE — DISCHARGE NOTE PROVIDER - NSDCCPCAREPLAN_GEN_ALL_CORE_FT
PRINCIPAL DISCHARGE DIAGNOSIS  Diagnosis: Takotsubo cardiomyopathy  Assessment and Plan of Treatment: Cont ASA  Followup with Dr. Andrew for repeat echo  Refrain from strenuous exercise and maintain hydration until your heart function improves.

## 2019-07-10 NOTE — PROGRESS NOTE ADULT - ASSESSMENT
68 f with    Chest pain with normal coronaries  - telemetry  - cardiology follow Dr. Perez    Cardiomyopathy  - cardiology follow.   - keep well hydrated     Osteoporosis  - hold Evista    DC home with close follow with PMD/ Cardiology in 2-3 days.    d/w patient  Ángel Vaughn MD pager [phone]
68 f with    Chest pain with normal coronaries  - telemetry  - cardiology evaluation Dr. Perez    Cardiomyopathy  - cardiology follow. recommendations pending      Osteoporosis  - hold Margo    d/w patient/    Ángel Vaughn MD pager [phone]
68yF no PMH sent for  outpt abnormal nuclear stress test and chest pain.     1. Chest pain   echo revealed mod to severe MR , mod to severe LV sys dysfx EF 35-40%, mid anterior wall is hypokinetic, akinetic apical walls   s/p Cath revealed normal  coronaries, mod NonICMP consistent with stress-induced cmp  no events on tele. cv stable no chest pain or sob  low dose asa  no bb/acei or arb given hypotension     2. NonICMP/ stress induced CMP  echo and cath as mentioned above   no bb/acei or arb given hypotension   no indication for diuretics at tis time     3. mod to sev MR   monitor for now  follow up with outpt cards for repeat echo     dvt ppx   dc planning today

## 2019-07-10 NOTE — PROGRESS NOTE ADULT - ATTENDING COMMENTS
Patient seen and examined, agree with the above assessment and plan by KATE Stuart.  ECHO results WMA abnormalities consistent w Takotsubo pattern  MR/CARLTON/ and mild LVOT obstruction likely functional from acute dilatation of the distal left vent segments  This should hopefully improve w medical therapy and time  Recommend discharge to followup w outpt cardio  defer bb use due to borderline BP   the pt to remain hydrated and avoid strenuous exercise for now until LV function improves

## 2019-07-10 NOTE — DISCHARGE NOTE PROVIDER - NSDCCPTREATMENT_GEN_ALL_CORE_FT
PRINCIPAL PROCEDURE  Procedure: Left heart cardiac cath  Findings and Treatment: normal coronaries      SECONDARY PROCEDURE  Procedure: 2D echo  Findings and Treatment: Conclusions:  1. Tethered mitral valve leaflets with normal  opening.Chordal CARLTON notes. Peak LVOT with valsalva 21mmHg  Moderate-severe mitral regurgitation.  2. Moderate- Severe segmental left ventricular systolic  dysfunction. The mid anterior wall is hypokinetic. The  apical anterior wall, the apical lateral wall, the  mid-apical  inferior wall, the mid -apical septum, and the  apical cap are akinetic.  3. Normal right ventricular size and function.  *** No previous Echo exam.  ------------------------------------------------------------------------  Confirmed on  7/10/2019 - 14:45:57 by Karli Castaneda M.D.  ------------------------------------------------------------------------

## 2019-07-10 NOTE — DISCHARGE NOTE PROVIDER - CARE PROVIDER_API CALL
Tom Major (MD)  Cardiovascular Disease; Internal Medicine  [facility], [address]  [place] [address]  Phone: [phone]  Fax: [phone]  Follow Up Time: 2 weeks

## 2019-07-17 NOTE — H&P ADULT - NSHPLABSRESULTS_GEN_ALL_CORE
14.5   8.2   )-----------( 215      ( 08 Jul 2019 19:02 )             41.4       07-08    138  |  101  |  11  ----------------------------<  127<H>  4.0   |  25  |  0.78    Ca    8.5      08 Jul 2019 19:02    TPro  6.3  /  Alb  3.8  /  TBili  0.6  /  DBili  x   /  AST  23  /  ALT  21  /  AlkPhos  49  07-08                  PT/INR - ( 08 Jul 2019 19:02 )   PT: 11.6 sec;   INR: 1.01 ratio         PTT - ( 08 Jul 2019 19:02 )  PTT:26.0 sec  < from: Xray Chest 2 Views PA/Lat (07.08.19 @ 20:08) >        < end of copied text >    EKG SR ST elevation ant leads Xeljanz Counseling: I discussed with the patient the risks of Xeljanz therapy including increased risk of infection, liver issues, headache, diarrhea, or cold symptoms. Live vaccines should be avoided. They were instructed to call if they have any problems.

## 2022-06-14 ENCOUNTER — NON-APPOINTMENT (OUTPATIENT)
Age: 72
End: 2022-06-14

## 2022-10-31 PROBLEM — M81.0 AGE-RELATED OSTEOPOROSIS WITHOUT CURRENT PATHOLOGICAL FRACTURE: Chronic | Status: ACTIVE | Noted: 2019-07-08

## 2022-12-02 PROBLEM — Z00.00 ENCOUNTER FOR PREVENTIVE HEALTH EXAMINATION: Status: ACTIVE | Noted: 2022-12-02

## 2022-12-08 ENCOUNTER — APPOINTMENT (OUTPATIENT)
Dept: MAMMOGRAPHY | Facility: CLINIC | Age: 72
End: 2022-12-08

## 2022-12-08 ENCOUNTER — APPOINTMENT (OUTPATIENT)
Dept: ULTRASOUND IMAGING | Facility: CLINIC | Age: 72
End: 2022-12-08

## 2022-12-08 ENCOUNTER — APPOINTMENT (OUTPATIENT)
Dept: RADIOLOGY | Facility: CLINIC | Age: 72
End: 2022-12-08

## 2022-12-08 PROCEDURE — 76641 ULTRASOUND BREAST COMPLETE: CPT | Mod: 50

## 2022-12-08 PROCEDURE — 77063 BREAST TOMOSYNTHESIS BI: CPT

## 2022-12-08 PROCEDURE — 77067 SCR MAMMO BI INCL CAD: CPT

## 2022-12-08 PROCEDURE — 77080 DXA BONE DENSITY AXIAL: CPT

## 2023-04-21 NOTE — DISCHARGE NOTE NURSING/CASE MANAGEMENT/SOCIAL WORK - NSDPACMPNY_GEN_ALL_CORE
As discussed please follow up with your primary doctor or specialists provided in the  next couple of days for a recheck.  Please go to the ER if you have increased pain, fevers, chills, difficulty breathing, chest pains or any concerns at all.   Spouse

## 2023-05-31 ENCOUNTER — APPOINTMENT (OUTPATIENT)
Dept: ENDOCRINOLOGY | Facility: CLINIC | Age: 73
End: 2023-05-31
Payer: COMMERCIAL

## 2023-05-31 VITALS
SYSTOLIC BLOOD PRESSURE: 124 MMHG | HEIGHT: 66 IN | OXYGEN SATURATION: 99 % | BODY MASS INDEX: 15.75 KG/M2 | WEIGHT: 98 LBS | DIASTOLIC BLOOD PRESSURE: 80 MMHG | HEART RATE: 69 BPM

## 2023-05-31 DIAGNOSIS — Z81.8 FAMILY HISTORY OF OTHER MENTAL AND BEHAVIORAL DISORDERS: ICD-10-CM

## 2023-05-31 PROCEDURE — 99204 OFFICE O/P NEW MOD 45 MIN: CPT

## 2023-06-03 PROBLEM — Z81.8 FAMILY HISTORY OF DEMENTIA: Status: ACTIVE | Noted: 2023-06-03

## 2023-06-03 LAB
25(OH)D3 SERPL-MCNC: 99 NG/ML
ALBUMIN SERPL ELPH-MCNC: 4.8 G/DL
ALP BLD-CCNC: 80 U/L
ALT SERPL-CCNC: 25 U/L
ANION GAP SERPL CALC-SCNC: 13 MMOL/L
AST SERPL-CCNC: 26 U/L
BILIRUB SERPL-MCNC: 0.4 MG/DL
BUN SERPL-MCNC: 14 MG/DL
CALCIUM SERPL-MCNC: 9.7 MG/DL
CALCIUM SERPL-MCNC: 9.7 MG/DL
CHLORIDE SERPL-SCNC: 101 MMOL/L
CO2 SERPL-SCNC: 27 MMOL/L
CREAT SERPL-MCNC: 0.82 MG/DL
EGFR: 75 ML/MIN/1.73M2
GLUCOSE SERPL-MCNC: 107 MG/DL
PARATHYROID HORMONE INTACT: 29 PG/ML
PHOSPHATE SERPL-MCNC: 3.7 MG/DL
POTASSIUM SERPL-SCNC: 4.7 MMOL/L
PROT SERPL-MCNC: 7.2 G/DL
SODIUM SERPL-SCNC: 141 MMOL/L

## 2023-06-03 NOTE — REVIEW OF SYSTEMS
[All other systems negative] : All other systems negative [Recent Weight Gain (___ Lbs)] : no recent weight gain [Recent Weight Loss (___ Lbs)] : no recent weight loss [Dysphagia] : no dysphagia [Dysphonia] : no dysphonia [Chest Pain] : no chest pain [Palpitations] : no palpitations [Shortness Of Breath] : no shortness of breath [Cough] : no cough [Nausea] : no nausea [Vomiting] : no vomiting [Irregular Menses] : regular menses [Joint Pain] : no joint pain [Myalgia] : no myalgia  [Difficulty Walking] : no difficulty walking [Poor Balance] : steady state balance [Hot Flashes] : no hot flashes

## 2023-06-03 NOTE — REASON FOR VISIT
[Consultation] : a consultation visit [Osteoporosis] : osteoporosis [FreeTextEntry2] : Oral Ziegler MD

## 2023-06-03 NOTE — ASSESSMENT
[FreeTextEntry1] : Pt is a 73 y/o female, post-menopausal on Evista referred for osteopenia.\par \par Bone mineral density: 2022\par Indication: vs 2018\par Spine: -1.7 osteopenia\par Total hip: -1.9 osteopenia\par Femoral neck: -2.3 (-8.2%) \par Patient advised that although bone density is consistent with osteopenia or risk for fracture including hip fracture is elevated which places her in the diagnosis of osteoporosis.  Patient advised that raloxifene not effective for preventing hip fracture.\par FRAX major osteoporotic fx risk 19%, hip fx 10%.\par Options of medical therapy discussed in detail.  Patient appears to be a reasonable candidate for standard oral therapy such as monthly risedronate.\par Risks and benefits of bisphosphonate therapy were discussed with the patient including gastroesophageal irritation, osteonecrosis of the jaw, and atypical femur fractures, and acute phase reaction. \par The patient was instructed to take bisphosphonates on an empty stomach with a full glass of water,and wait at least 30 minutes before eating or lying down.\par \par Starting monthly risedronate. Checking labs today. Recent calcium, albumin and renal function normal.\par Recommended calcium 500 mg per day, vitamin D. 1000 units per day, in addition to dietary intake. \par I have requested a basic metabolic work-up to help exclude secondary causes of osteoporosis such as parathyroid excess.  I recommended we can monitor response to therapy initially with biochemical markers of bone turnover and then repeat bone density in 1 year. \par RTC 4 months.\par \par Jose Metzger DO, Endocrinology Fellow\par \par

## 2023-06-03 NOTE — HISTORY OF PRESENT ILLNESS
[Alendronate (Fosomax)] : Alendronate [Calcium (dietary)] : dietary Calcium [Vitamin D (oral)] : Vitamin D orally [Raloxifene (Evista)] : Raloxifene [Regular Dental Follow-Up] : regular dental follow-up [Family History of Hip Fracture] : family history of hip fracture [Family History of Osteoporosis] : family history of osteoporosis [Low Calcium Intake] : no low calcium intake [Low Vit D Intake/Exposure] : no low vitamin D intake [Premat. Menopause (natural)] : no history of premature menopause [Premat. Menopause (surgery)] : no history of premature surgical menopause [Amenorrhea] : no amenorrhea [Disordered Eating] : no history of disordered eating [Taking Steroids] : no history of taking steroids [Hyperparathyroidism] : no history of hyperparathyroidism [Diabetes] : no history of diabetes [Kidney Stones] : no history of kidney stones [Excessive Alcohol Intake] : no excessive alcohol intake [Excessive Soda] : no excessive soda [Sedentary] : no sedentary lifestyle [Current Smoking___(ppd)] : not currently smoking [Previous Fragility Fracture] : no previous fragility fracture [History of Radiation Therapy] : no history of radiation therapy [History of Blood Clots] : no history of blood clots [High Fall Risk] : no fall risk [Frequent Falls] : no frequent falls [Uses a Walker/Cane] : no use of a walker/cane [Inability to Stand Straight] : no inability to stand straight [Loss of Height] : no loss of height [Loss of Balance] : no loss of balance [Change in Clothing Fit] : no change in clothing fit [Weight Gain] : no weight gain [Difficulty Ambulating] : no difficulty ambulating [Hip Pain] : no hip pain [Wrist Pain] : no wrist pain [Difficulty with Stairs] : no difficulty with stairs [Arthralgias] : no arthralgias [Myalgias] : no myalgias [New Fracture] : no new fracture [de-identified] : No plans for major dental work. Sees dentist q6 months. Unclear FHx of breast cancer as mother had lumpectomy and was on tamoxifen in trial. [FreeTextEntry1] : Fosomax previously for 3-5 years. Tolerated well. Switched to Evista 60mg daily around 25 years ago. Has been on it since. No menopausal symptoms before or during treatment.

## 2023-06-03 NOTE — PHYSICAL EXAM
[Alert] : alert [Well Nourished] : well nourished [No Acute Distress] : no acute distress [EOMI] : extra ocular movement intact [No Proptosis] : no proptosis [No Lid Lag] : no lid lag [Supple] : the neck was supple [Thyroid Not Enlarged] : the thyroid was not enlarged [No Thyroid Nodules] : no palpable thyroid nodules [No Respiratory Distress] : no respiratory distress [No Accessory Muscle Use] : no accessory muscle use [Normal Rate and Effort] : normal respiratory rate and effort [Normal Rate] : heart rate was normal [Regular Rhythm] : with a regular rhythm [No Edema] : no peripheral edema [Not Tender] : non-tender [Soft] : abdomen soft [No Spinal Tenderness] : no spinal tenderness [Spine Straight] : spine straight [No Involuntary Movements] : no involuntary movements were seen [No Rash] : no rash [Cranial Nerves Intact] : cranial nerves 2-12 were intact [No Motor Deficits] : the motor exam was normal [Oriented x3] : oriented to person, place, and time [Normal Affect] : the affect was normal [Normal Mood] : the mood was normal [Kyphosis] : no kyphosis present

## 2023-06-06 LAB — COLLAGEN CTX SERPL-MCNC: 216 PG/ML

## 2023-08-02 ENCOUNTER — NON-APPOINTMENT (OUTPATIENT)
Age: 73
End: 2023-08-02

## 2023-10-03 ENCOUNTER — APPOINTMENT (OUTPATIENT)
Dept: ENDOCRINOLOGY | Facility: CLINIC | Age: 73
End: 2023-10-03
Payer: COMMERCIAL

## 2023-10-03 ENCOUNTER — TRANSCRIPTION ENCOUNTER (OUTPATIENT)
Age: 73
End: 2023-10-03

## 2023-10-03 VITALS
DIASTOLIC BLOOD PRESSURE: 80 MMHG | WEIGHT: 96 LBS | SYSTOLIC BLOOD PRESSURE: 124 MMHG | HEIGHT: 66 IN | BODY MASS INDEX: 15.43 KG/M2 | OXYGEN SATURATION: 98 % | HEART RATE: 86 BPM

## 2023-10-03 PROCEDURE — 99213 OFFICE O/P EST LOW 20 MIN: CPT

## 2023-10-04 LAB
ALBUMIN SERPL ELPH-MCNC: 4.4 G/DL
ALP BLD-CCNC: 67 U/L
ALT SERPL-CCNC: 21 U/L
ANION GAP SERPL CALC-SCNC: 9 MMOL/L
AST SERPL-CCNC: 20 U/L
BILIRUB SERPL-MCNC: 0.6 MG/DL
BUN SERPL-MCNC: 14 MG/DL
CALCIUM SERPL-MCNC: 9.2 MG/DL
CHLORIDE SERPL-SCNC: 100 MMOL/L
CO2 SERPL-SCNC: 28 MMOL/L
CREAT SERPL-MCNC: 0.81 MG/DL
EGFR: 77 ML/MIN/1.73M2
GLUCOSE SERPL-MCNC: 90 MG/DL
POTASSIUM SERPL-SCNC: 4.6 MMOL/L
PROT SERPL-MCNC: 6.8 G/DL
SODIUM SERPL-SCNC: 137 MMOL/L

## 2023-10-10 LAB — COLLAGEN CTX SERPL-MCNC: 46 PG/ML

## 2023-11-08 ENCOUNTER — APPOINTMENT (OUTPATIENT)
Dept: OBGYN | Facility: CLINIC | Age: 73
End: 2023-11-08
Payer: COMMERCIAL

## 2023-11-08 PROCEDURE — 99397 PER PM REEVAL EST PAT 65+ YR: CPT

## 2023-12-29 ENCOUNTER — APPOINTMENT (OUTPATIENT)
Dept: ULTRASOUND IMAGING | Facility: CLINIC | Age: 73
End: 2023-12-29
Payer: COMMERCIAL

## 2023-12-29 ENCOUNTER — APPOINTMENT (OUTPATIENT)
Dept: MAMMOGRAPHY | Facility: CLINIC | Age: 73
End: 2023-12-29
Payer: COMMERCIAL

## 2023-12-29 PROCEDURE — 76641 ULTRASOUND BREAST COMPLETE: CPT | Mod: 50

## 2023-12-29 PROCEDURE — 77063 BREAST TOMOSYNTHESIS BI: CPT

## 2023-12-29 PROCEDURE — 77067 SCR MAMMO BI INCL CAD: CPT

## 2024-05-13 ENCOUNTER — APPOINTMENT (OUTPATIENT)
Dept: ENDOCRINOLOGY | Facility: CLINIC | Age: 74
End: 2024-05-13
Payer: COMMERCIAL

## 2024-05-13 VITALS — BODY MASS INDEX: 16.19 KG/M2 | HEIGHT: 64.5 IN | WEIGHT: 96 LBS

## 2024-05-13 VITALS — OXYGEN SATURATION: 99 % | SYSTOLIC BLOOD PRESSURE: 140 MMHG | DIASTOLIC BLOOD PRESSURE: 80 MMHG | HEART RATE: 72 BPM

## 2024-05-13 DIAGNOSIS — M81.0 AGE-RELATED OSTEOPOROSIS W/OUT CURRENT PATHOLOGICAL FRACTURE: ICD-10-CM

## 2024-05-13 PROCEDURE — 99213 OFFICE O/P EST LOW 20 MIN: CPT

## 2024-05-13 PROCEDURE — ZZZZZ: CPT

## 2024-05-13 PROCEDURE — G2211 COMPLEX E/M VISIT ADD ON: CPT

## 2024-05-13 PROCEDURE — 77080 DXA BONE DENSITY AXIAL: CPT

## 2024-05-13 RX ORDER — RALOXIFENE HYDROCHLORIDE 60 MG/1
60 TABLET, FILM COATED ORAL
Qty: 90 | Refills: 0 | Status: DISCONTINUED | COMMUNITY
Start: 2023-03-21 | End: 2024-05-13

## 2024-05-13 RX ORDER — RISEDRONATE SODIUM 150 MG/1
150 TABLET, FILM COATED ORAL
Qty: 1 | Refills: 3 | Status: ACTIVE | COMMUNITY
Start: 2023-05-31 | End: 1900-01-01

## 2024-05-14 PROBLEM — M81.0 OSTEOPOROSIS, POST-MENOPAUSAL: Status: ACTIVE | Noted: 2023-05-31

## 2024-05-14 NOTE — ASSESSMENT
[Bisphosphonate Therapy] : Risks and benefits of bisphosphonate therapy were  discussed with the patient including gastroesophageal irritation, osteonecrosis of the jaw, and atypical femur fractures, and acute phase reaction [Bisphosphonates] : The patient was instructed to take bisphosphonates on an empty stomach with a full glass of water,and wait at least 30 minutes before eating or lying down [FreeTextEntry1] : Pt is a 74 y/o female here for follow up for osteopenia.    BMD 2022 shows spine -1.7, osteopenia, total hip -1.9, osteopenia and fem neck -2.3, osteopenia. Began Risedronate 5/2023 taking correctly tolerating well. No UGI sx. No thigh pain. No interval fractures. Was on Evista, stopped. BMD 05/2024 shows improved osteopenia in the spine L1-3 and stable osteopenia in the total hip, fem neck, and prox radius.  I recommend continuing with Risedronate for at least 2 years before taking an early drug holiday. I discussed that based on the results of next year's BMD, we will adjust treatment accordingly.  F/u in 1 year for BMD.

## 2024-05-14 NOTE — PROCEDURE
[FreeTextEntry1] : BMD 05/13/2024 compared to outside study 2022, assess response to medication Total Hip: -2.2, osteopenia, ns Spine L1-3: -1.5, osteopenia, prior -1.7 with incorrectly analyzed L4 Femoral Neck: -2.4, osteopenia, ns Forearm: -2.3, osteopenia, no prior  Bone mineral density: 2022 Indication: vs 2018 Spine: -1.7 osteopenia Total hip: -1.9 osteopenia Femoral neck: -2.3 (-8.2%)  FRAX major osteoporotic fx risk 19%, hip fx 10%.

## 2024-05-14 NOTE — END OF VISIT
[FreeTextEntry3] :  I, Sherie Linder, authored this note working as a medical scribe for Dr. Pablo.  05/13/2024 .  This note was authored by the medical scribe for me. I have reviewed, edited, and revised the note as needed. I am in agreement with the exam findings, imaging findings, and treatment plan.  Олег Pablo MD

## 2024-05-14 NOTE — HISTORY OF PRESENT ILLNESS
[Alendronate (Fosomax)] : Alendronate [Calcium (dietary)] : dietary Calcium [Vitamin D (oral)] : Vitamin D orally [Raloxifene (Evista)] : Raloxifene [Regular Dental Follow-Up] : regular dental follow-up [Family History of Hip Fracture] : family history of hip fracture [Family History of Osteoporosis] : family history of osteoporosis [Low Calcium Intake] : no low calcium intake [Low Vit D Intake/Exposure] : no low vitamin D intake [Premat. Menopause (natural)] : no history of premature menopause [Premat. Menopause (surgery)] : no history of premature surgical menopause [Amenorrhea] : no amenorrhea [Disordered Eating] : no history of disordered eating [Taking Steroids] : no history of taking steroids [Hyperparathyroidism] : no history of hyperparathyroidism [Diabetes] : no history of diabetes [Kidney Stones] : no history of kidney stones [Excessive Alcohol Intake] : no excessive alcohol intake [Excessive Soda] : no excessive soda [Sedentary] : no sedentary lifestyle [Current Smoking___(ppd)] : not currently smoking [Previous Fragility Fracture] : no previous fragility fracture [History of Radiation Therapy] : no history of radiation therapy [History of Blood Clots] : no history of blood clots [High Fall Risk] : no fall risk [Frequent Falls] : no frequent falls [Uses a Walker/Cane] : no use of a walker/cane [Inability to Stand Straight] : no inability to stand straight [Loss of Height] : no loss of height [Loss of Balance] : no loss of balance [Change in Clothing Fit] : no change in clothing fit [Weight Gain] : no weight gain [Difficulty Ambulating] : no difficulty ambulating [Hip Pain] : no hip pain [Wrist Pain] : no wrist pain [Difficulty with Stairs] : no difficulty with stairs [Arthralgias] : no arthralgias [Myalgias] : no myalgias [New Fracture] : no new fracture [FreeTextEntry1] : Fosomax previously for 3-5 years. Tolerated well. Switched to Evista 60mg daily around 25 years ago. Has been on it since. No menopausal symptoms before or during treatment. [de-identified] : No plans for major dental work. Sees dentist q6 months. Unclear FHx of breast cancer as mother had lumpectomy and was on tamoxifen in trial.

## 2024-05-14 NOTE — PHYSICAL EXAM
[Alert] : alert [Well Nourished] : well nourished [No Acute Distress] : no acute distress [EOMI] : extra ocular movement intact [No Proptosis] : no proptosis [No Lid Lag] : no lid lag [Supple] : the neck was supple [Thyroid Not Enlarged] : the thyroid was not enlarged [No Thyroid Nodules] : no palpable thyroid nodules [No Respiratory Distress] : no respiratory distress [No Accessory Muscle Use] : no accessory muscle use [Normal Rate and Effort] : normal respiratory rate and effort [Normal Rate] : heart rate was normal [Regular Rhythm] : with a regular rhythm [No Edema] : no peripheral edema [Not Tender] : non-tender [Soft] : abdomen soft [No Spinal Tenderness] : no spinal tenderness [Spine Straight] : spine straight [No Involuntary Movements] : no involuntary movements were seen [No Rash] : no rash [Cranial Nerves Intact] : cranial nerves 2-12 were intact [No Motor Deficits] : the motor exam was normal [Oriented x3] : oriented to person, place, and time [Normal Affect] : the affect was normal [Normal Mood] : the mood was normal [Normal Anterior Cervical Nodes] : no anterior cervical lymphadenopathy [Kyphosis] : no kyphosis present

## 2025-01-08 ENCOUNTER — APPOINTMENT (OUTPATIENT)
Dept: OBGYN | Facility: CLINIC | Age: 75
End: 2025-01-08
Payer: COMMERCIAL

## 2025-01-08 PROCEDURE — 96127 BRIEF EMOTIONAL/BEHAV ASSMT: CPT

## 2025-01-08 PROCEDURE — 99397 PER PM REEVAL EST PAT 65+ YR: CPT

## 2025-01-09 ENCOUNTER — APPOINTMENT (OUTPATIENT)
Dept: MAMMOGRAPHY | Facility: CLINIC | Age: 75
End: 2025-01-09
Payer: COMMERCIAL

## 2025-01-09 ENCOUNTER — APPOINTMENT (OUTPATIENT)
Dept: ULTRASOUND IMAGING | Facility: CLINIC | Age: 75
End: 2025-01-09
Payer: COMMERCIAL

## 2025-01-09 PROCEDURE — 77067 SCR MAMMO BI INCL CAD: CPT

## 2025-01-09 PROCEDURE — 76641 ULTRASOUND BREAST COMPLETE: CPT | Mod: 50

## 2025-01-09 PROCEDURE — 77063 BREAST TOMOSYNTHESIS BI: CPT

## 2025-05-27 ENCOUNTER — APPOINTMENT (OUTPATIENT)
Dept: ENDOCRINOLOGY | Facility: CLINIC | Age: 75
End: 2025-05-27
Payer: COMMERCIAL

## 2025-05-27 VITALS
DIASTOLIC BLOOD PRESSURE: 70 MMHG | WEIGHT: 97 LBS | HEIGHT: 64.9 IN | BODY MASS INDEX: 16.16 KG/M2 | HEART RATE: 69 BPM | OXYGEN SATURATION: 98 % | SYSTOLIC BLOOD PRESSURE: 112 MMHG

## 2025-05-27 DIAGNOSIS — M81.0 AGE-RELATED OSTEOPOROSIS W/OUT CURRENT PATHOLOGICAL FRACTURE: ICD-10-CM

## 2025-05-27 PROCEDURE — ZZZZZ: CPT

## 2025-05-27 PROCEDURE — 77080 DXA BONE DENSITY AXIAL: CPT

## 2025-05-27 PROCEDURE — 99213 OFFICE O/P EST LOW 20 MIN: CPT

## 2025-05-27 PROCEDURE — G2211 COMPLEX E/M VISIT ADD ON: CPT | Mod: NC
